# Patient Record
Sex: FEMALE | Race: OTHER
[De-identification: names, ages, dates, MRNs, and addresses within clinical notes are randomized per-mention and may not be internally consistent; named-entity substitution may affect disease eponyms.]

---

## 2017-11-08 ENCOUNTER — RESULT REVIEW (OUTPATIENT)
Age: 22
End: 2017-11-08

## 2018-09-11 PROBLEM — Z00.00 ENCOUNTER FOR PREVENTIVE HEALTH EXAMINATION: Status: ACTIVE | Noted: 2018-09-11

## 2018-11-13 ENCOUNTER — RESULT REVIEW (OUTPATIENT)
Age: 23
End: 2018-11-13

## 2019-11-13 ENCOUNTER — RESULT REVIEW (OUTPATIENT)
Age: 24
End: 2019-11-13

## 2020-08-12 ENCOUNTER — TRANSCRIPTION ENCOUNTER (OUTPATIENT)
Age: 25
End: 2020-08-12

## 2020-08-18 ENCOUNTER — APPOINTMENT (OUTPATIENT)
Dept: PULMONOLOGY | Facility: CLINIC | Age: 25
End: 2020-08-18
Payer: COMMERCIAL

## 2020-08-18 VITALS
WEIGHT: 175 LBS | BODY MASS INDEX: 26.52 KG/M2 | OXYGEN SATURATION: 98 % | DIASTOLIC BLOOD PRESSURE: 76 MMHG | SYSTOLIC BLOOD PRESSURE: 128 MMHG | HEART RATE: 92 BPM | HEIGHT: 68 IN

## 2020-08-18 DIAGNOSIS — R06.00 DYSPNEA, UNSPECIFIED: ICD-10-CM

## 2020-08-18 DIAGNOSIS — U07.1 COVID-19: ICD-10-CM

## 2020-08-18 PROCEDURE — 99204 OFFICE O/P NEW MOD 45 MIN: CPT

## 2020-08-18 NOTE — DISCUSSION/SUMMARY
[FreeTextEntry1] : covid 19 infection in April\par mostly loss of taste and smell, still residual\par episodes of SOB, associated with cough intermittent over months, worse recently\par No fevers, No hx asthma or lung disease, never smoker\par Recent urgent care , Covid negative, has positive Ab\par Lung exam is normal, normal sp02\par Discussed options with pt, she does not want ER\par Given prolonged symptoms and overall improved-as she is back at work\par  will obtain stat Covid bundle, D dimer, CXR, duplex, CTA if D dimer positive\par Further input post above

## 2020-08-18 NOTE — REVIEW OF SYSTEMS
[Chest Discomfort] : chest discomfort [Negative] : Psychiatric [TextBox_14] : loss of smell [TextBox_30] : see HPI

## 2020-08-18 NOTE — CONSULT LETTER
[Dear  ___] : Dear  [unfilled], [Consult Letter:] : I had the pleasure of evaluating your patient, [unfilled]. [Please see my note below.] : Please see my note below. [Sincerely,] : Sincerely, [FreeTextEntry3] : Lan Sanchez DO [facility]\par Pulmonary Critical Care\par Director Pulmonary Division\par Medical Director Respiratory Therapy\par [facility]\par \par

## 2020-08-18 NOTE — PHYSICAL EXAM
[No Acute Distress] : no acute distress [Normal Appearance] : normal appearance [Normal Rate/Rhythm] : normal rate/rhythm [Normal S1, S2] : normal s1, s2 [No Murmurs] : no murmurs [No Resp Distress] : no resp distress [Clear to Auscultation Bilaterally] : clear to auscultation bilaterally [Normal to Percussion] : normal to percussion [Normal Gait] : normal gait [No Abnormalities] : no abnormalities [No Clubbing] : no clubbing [No Cyanosis] : no cyanosis [Normal Color/ Pigmentation] : normal color/ pigmentation [No Edema] : no edema [Oriented x3] : oriented x3 [No Focal Deficits] : no focal deficits [Normal Affect] : normal affect

## 2020-08-18 NOTE — HISTORY OF PRESENT ILLNESS
[TextBox_4] : covid positive april\par loss of smell and taste and SOB\par no fevers, \par worsening SOB intermittent over 2 days\par no fevers, \par having fatigue\par works in  [employer]\par not sexually active\par never smoker\par recent urgent care visit, Covid negative

## 2020-08-19 LAB
ALBUMIN SERPL ELPH-MCNC: 4.7 G/DL
ALP BLD-CCNC: 43 U/L
ALT SERPL-CCNC: 16 U/L
ANION GAP SERPL CALC-SCNC: 13 MMOL/L
AST SERPL-CCNC: 17 U/L
BASOPHILS # BLD AUTO: 0.03 K/UL
BASOPHILS NFR BLD AUTO: 0.6 %
BILIRUB SERPL-MCNC: <0.2 MG/DL
BUN SERPL-MCNC: 10 MG/DL
CALCIUM SERPL-MCNC: 9.9 MG/DL
CHLORIDE SERPL-SCNC: 106 MMOL/L
CO2 SERPL-SCNC: 22 MMOL/L
CREAT SERPL-MCNC: 0.74 MG/DL
CRP SERPL-MCNC: 0.64 MG/DL
DEPRECATED D DIMER PPP IA-ACNC: <150 NG/ML DDU
EOSINOPHIL # BLD AUTO: 0.05 K/UL
EOSINOPHIL NFR BLD AUTO: 1 %
FERRITIN SERPL-MCNC: 26 NG/ML
GLUCOSE SERPL-MCNC: 107 MG/DL
HCT VFR BLD CALC: 45.8 %
HGB BLD-MCNC: 14.4 G/DL
IMM GRANULOCYTES NFR BLD AUTO: 0.2 %
LYMPHOCYTES # BLD AUTO: 1.39 K/UL
LYMPHOCYTES NFR BLD AUTO: 27.8 %
MAN DIFF?: NORMAL
MCHC RBC-ENTMCNC: 29.1 PG
MCHC RBC-ENTMCNC: 31.4 GM/DL
MCV RBC AUTO: 92.7 FL
MONOCYTES # BLD AUTO: 0.39 K/UL
MONOCYTES NFR BLD AUTO: 7.8 %
NEUTROPHILS # BLD AUTO: 3.13 K/UL
NEUTROPHILS NFR BLD AUTO: 62.6 %
PLATELET # BLD AUTO: 346 K/UL
POTASSIUM SERPL-SCNC: 4.4 MMOL/L
PROCALCITONIN SERPL-MCNC: 0.05 NG/ML
PROT SERPL-MCNC: 7 G/DL
RBC # BLD: 4.94 M/UL
RBC # FLD: 12.7 %
SODIUM SERPL-SCNC: 141 MMOL/L
WBC # FLD AUTO: 5 K/UL

## 2020-10-11 ENCOUNTER — TRANSCRIPTION ENCOUNTER (OUTPATIENT)
Age: 25
End: 2020-10-11

## 2020-12-02 ENCOUNTER — RESULT REVIEW (OUTPATIENT)
Age: 25
End: 2020-12-02

## 2021-12-27 ENCOUNTER — RESULT REVIEW (OUTPATIENT)
Age: 26
End: 2021-12-27

## 2022-12-21 ENCOUNTER — OFFICE (OUTPATIENT)
Dept: URBAN - METROPOLITAN AREA CLINIC 114 | Facility: CLINIC | Age: 27
Setting detail: OPHTHALMOLOGY
End: 2022-12-21
Payer: COMMERCIAL

## 2022-12-21 DIAGNOSIS — H01.005: ICD-10-CM

## 2022-12-21 DIAGNOSIS — H01.002: ICD-10-CM

## 2022-12-21 DIAGNOSIS — H52.13: ICD-10-CM

## 2022-12-21 PROCEDURE — 92004 COMPRE OPH EXAM NEW PT 1/>: CPT | Performed by: SPECIALIST

## 2022-12-21 PROCEDURE — 92015 DETERMINE REFRACTIVE STATE: CPT | Performed by: SPECIALIST

## 2022-12-21 ASSESSMENT — CONFRONTATIONAL VISUAL FIELD TEST (CVF)
OD_FINDINGS: FULL
OS_FINDINGS: FULL

## 2022-12-21 ASSESSMENT — REFRACTION_MANIFEST
OD_SPHERE: -1.75
OS_SPHERE: -1.75

## 2022-12-21 ASSESSMENT — LID EXAM ASSESSMENTS
OD_BLEPHARITIS: RLL T
OS_BLEPHARITIS: LLL T

## 2022-12-21 ASSESSMENT — REFRACTION_CURRENTRX
OD_SPHERE: -1.50
OD_OVR_VA: 20/
OS_OVR_VA: 20/
OS_SPHERE: -1.50

## 2022-12-21 ASSESSMENT — VISUAL ACUITY
OD_BCVA: 20/20
OS_BCVA: 20/20

## 2022-12-21 ASSESSMENT — REFRACTION_AUTOREFRACTION
OD_SPHERE: -1.75
OS_SPHERE: -1.75

## 2022-12-21 ASSESSMENT — SUPERFICIAL PUNCTATE KERATITIS (SPK)
OD_SPK: T
OS_SPK: T

## 2022-12-21 ASSESSMENT — TONOMETRY
OS_IOP_MMHG: 16
OD_IOP_MMHG: 16

## 2023-03-23 ENCOUNTER — APPOINTMENT (OUTPATIENT)
Dept: DERMATOLOGY | Facility: CLINIC | Age: 28
End: 2023-03-23
Payer: COMMERCIAL

## 2023-03-23 PROCEDURE — 99203 OFFICE O/P NEW LOW 30 MIN: CPT | Mod: 25

## 2023-03-23 PROCEDURE — 11900 INJECT SKIN LESIONS </W 7: CPT

## 2023-07-20 ENCOUNTER — APPOINTMENT (OUTPATIENT)
Dept: DERMATOLOGY | Facility: CLINIC | Age: 28
End: 2023-07-20
Payer: COMMERCIAL

## 2023-07-20 PROCEDURE — 99214 OFFICE O/P EST MOD 30 MIN: CPT

## 2023-12-21 ENCOUNTER — NON-APPOINTMENT (OUTPATIENT)
Age: 28
End: 2023-12-21

## 2023-12-22 ENCOUNTER — RESULT REVIEW (OUTPATIENT)
Age: 28
End: 2023-12-22

## 2024-07-01 LAB — TSH SERPL-ACNC: 2.75

## 2024-07-02 ENCOUNTER — APPOINTMENT (OUTPATIENT)
Dept: ENDOCRINOLOGY | Facility: CLINIC | Age: 29
End: 2024-07-02
Payer: COMMERCIAL

## 2024-07-02 ENCOUNTER — NON-APPOINTMENT (OUTPATIENT)
Age: 29
End: 2024-07-02

## 2024-07-02 VITALS
HEIGHT: 68 IN | SYSTOLIC BLOOD PRESSURE: 118 MMHG | OXYGEN SATURATION: 99 % | WEIGHT: 187 LBS | DIASTOLIC BLOOD PRESSURE: 78 MMHG | BODY MASS INDEX: 28.34 KG/M2 | HEART RATE: 91 BPM

## 2024-07-02 DIAGNOSIS — Z78.9 OTHER SPECIFIED HEALTH STATUS: ICD-10-CM

## 2024-07-02 DIAGNOSIS — Z83.3 FAMILY HISTORY OF DIABETES MELLITUS: ICD-10-CM

## 2024-07-02 DIAGNOSIS — O99.280 ENDOCRINE, NUTRITIONAL AND METABOLIC DISEASES COMPLICATING PREGNANCY, UNSPECIFIED TRIMESTER: ICD-10-CM

## 2024-07-02 DIAGNOSIS — Z83.49 FAMILY HISTORY OF OTHER ENDOCRINE, NUTRITIONAL AND METABOLIC DISEASES: ICD-10-CM

## 2024-07-02 DIAGNOSIS — Z80.1 FAMILY HISTORY OF MALIGNANT NEOPLASM OF TRACHEA, BRONCHUS AND LUNG: ICD-10-CM

## 2024-07-02 DIAGNOSIS — E03.9 ENDOCRINE, NUTRITIONAL AND METABOLIC DISEASES COMPLICATING PREGNANCY, UNSPECIFIED TRIMESTER: ICD-10-CM

## 2024-07-02 PROCEDURE — 99203 OFFICE O/P NEW LOW 30 MIN: CPT

## 2024-07-02 RX ORDER — LEVOTHYROXINE SODIUM 125 UG/1
125 TABLET ORAL
Refills: 0 | Status: ACTIVE | COMMUNITY

## 2024-07-03 LAB
ALBUMIN SERPL ELPH-MCNC: 4.1 G/DL
ALP BLD-CCNC: 51 U/L
ALT SERPL-CCNC: 12 U/L
ANION GAP SERPL CALC-SCNC: 12 MMOL/L
AST SERPL-CCNC: 14 U/L
BILIRUB SERPL-MCNC: 0.2 MG/DL
BUN SERPL-MCNC: 9 MG/DL
CALCIUM SERPL-MCNC: 9.5 MG/DL
CHLORIDE SERPL-SCNC: 103 MMOL/L
CO2 SERPL-SCNC: 20 MMOL/L
CREAT SERPL-MCNC: 0.51 MG/DL
EGFR: 130 ML/MIN/1.73M2
GLUCOSE SERPL-MCNC: 82 MG/DL
POTASSIUM SERPL-SCNC: 4 MMOL/L
PROT SERPL-MCNC: 6.7 G/DL
SODIUM SERPL-SCNC: 136 MMOL/L
T3FREE SERPL-MCNC: 2.64 PG/ML
T4 FREE SERPL-MCNC: 1.3 NG/DL
TSH SERPL-ACNC: 1.89 UIU/ML

## 2024-07-05 LAB
THYROGLOB AB SERPL-ACNC: <20 IU/ML
THYROPEROXIDASE AB SERPL IA-ACNC: 2506 IU/ML

## 2024-08-30 ENCOUNTER — LABORATORY RESULT (OUTPATIENT)
Age: 29
End: 2024-08-30

## 2024-09-05 ENCOUNTER — APPOINTMENT (OUTPATIENT)
Dept: ENDOCRINOLOGY | Facility: CLINIC | Age: 29
End: 2024-09-05
Payer: COMMERCIAL

## 2024-09-05 VITALS
HEART RATE: 88 BPM | OXYGEN SATURATION: 99 % | DIASTOLIC BLOOD PRESSURE: 52 MMHG | HEIGHT: 68 IN | WEIGHT: 190 LBS | BODY MASS INDEX: 28.79 KG/M2 | SYSTOLIC BLOOD PRESSURE: 100 MMHG

## 2024-09-05 DIAGNOSIS — O99.280 ENDOCRINE, NUTRITIONAL AND METABOLIC DISEASES COMPLICATING PREGNANCY, UNSPECIFIED TRIMESTER: ICD-10-CM

## 2024-09-05 DIAGNOSIS — E03.9 ENDOCRINE, NUTRITIONAL AND METABOLIC DISEASES COMPLICATING PREGNANCY, UNSPECIFIED TRIMESTER: ICD-10-CM

## 2024-09-05 PROCEDURE — 99213 OFFICE O/P EST LOW 20 MIN: CPT

## 2024-09-05 NOTE — REVIEW OF SYSTEMS
[Fatigue] : no fatigue [Visual Field Defect] : no visual field defect [Dysphagia] : no dysphagia [Chest Pain] : no chest pain [Palpitations] : no palpitations [Shortness Of Breath] : no shortness of breath [Nausea] : no nausea [Constipation] : constipation [Vomiting] : no vomiting [Diarrhea] : no diarrhea [Polyuria] : no polyuria [Polydipsia] : no polydipsia

## 2024-09-05 NOTE — ASSESSMENT
[Levothyroxine] : The patient was instructed to take Levothyroxine on an empty stomach, separate from vitamins, and wait at least 30 minutes before eating [FreeTextEntry1] : Hypothyroidism in pregnancy   -Check TFTs, TPO -Goal TSH <2.5 during pregnancy -Check TFTs every 4 weeks during pregnancy rx given for Sept, Oct, Nov -Patient verbalized understanding and all questions answered -For now Continue Synthroid 125 mcg QD   RTO in 8 weeks  MD

## 2024-09-05 NOTE — HISTORY OF PRESENT ILLNESS
[FreeTextEntry1] : Patient was sent in by OB for thyroid evaluation. Prior to preganancy was on LT4 since 2021. She was on LT4 100 mcg. Went through IVF and once she got pregnant Her TSH went up to 7--> was increased to 112 mcg then 125mcg then down to 112 mcg.   Now on Synthroid 125 mcg since 5/22/2024  OB: Dr. Georgia [place]  LMP: 2/1/2023 BUZZ: 12/6/2024 Weeks: 27 weeks  G1 P 0  IVF pregnancy, Embryo transfer was on 3/2/2024  Quality: Hypothyroidism  Onset: was feeling out all the time Duration: 2021 Modifying factors: LT4  Family Hx: Mom hypothyroidism, maternal family  Denies thyroid cancer  Current Medications: Synthroid 125 mcg QD ([facility]) PNV, takes 4 hours after   Thyroid U/S: never    Weight:  Pre-Preg 180 pounds

## 2024-09-10 ENCOUNTER — OUTPATIENT (OUTPATIENT)
Dept: OUTPATIENT SERVICES | Facility: HOSPITAL | Age: 29
LOS: 1 days | End: 2024-09-10
Payer: COMMERCIAL

## 2024-09-10 VITALS — HEART RATE: 89 BPM | DIASTOLIC BLOOD PRESSURE: 69 MMHG | SYSTOLIC BLOOD PRESSURE: 118 MMHG

## 2024-09-10 VITALS
TEMPERATURE: 99 F | DIASTOLIC BLOOD PRESSURE: 81 MMHG | SYSTOLIC BLOOD PRESSURE: 133 MMHG | RESPIRATION RATE: 18 BRPM | HEART RATE: 87 BPM

## 2024-09-10 DIAGNOSIS — O26.899 OTHER SPECIFIED PREGNANCY RELATED CONDITIONS, UNSPECIFIED TRIMESTER: ICD-10-CM

## 2024-09-10 LAB
ALBUMIN SERPL ELPH-MCNC: 3.5 G/DL — SIGNIFICANT CHANGE UP (ref 3.3–5.2)
ALP SERPL-CCNC: 73 U/L — SIGNIFICANT CHANGE UP (ref 40–120)
ALT FLD-CCNC: 9 U/L — SIGNIFICANT CHANGE UP
ANION GAP SERPL CALC-SCNC: 13 MMOL/L — SIGNIFICANT CHANGE UP (ref 5–17)
APPEARANCE UR: CLEAR — SIGNIFICANT CHANGE UP
APTT BLD: 28.2 SEC — SIGNIFICANT CHANGE UP (ref 24.5–35.6)
AST SERPL-CCNC: 15 U/L — SIGNIFICANT CHANGE UP
BACTERIA # UR AUTO: NEGATIVE /HPF — SIGNIFICANT CHANGE UP
BASOPHILS # BLD AUTO: 0.02 K/UL — SIGNIFICANT CHANGE UP (ref 0–0.2)
BASOPHILS NFR BLD AUTO: 0.2 % — SIGNIFICANT CHANGE UP (ref 0–2)
BILIRUB SERPL-MCNC: <0.2 MG/DL — LOW (ref 0.4–2)
BILIRUB UR-MCNC: NEGATIVE — SIGNIFICANT CHANGE UP
BUN SERPL-MCNC: 6.4 MG/DL — LOW (ref 8–20)
CALCIUM SERPL-MCNC: 8.7 MG/DL — SIGNIFICANT CHANGE UP (ref 8.4–10.5)
CAST: 1 /LPF — SIGNIFICANT CHANGE UP (ref 0–4)
CHLORIDE SERPL-SCNC: 102 MMOL/L — SIGNIFICANT CHANGE UP (ref 96–108)
CO2 SERPL-SCNC: 21 MMOL/L — LOW (ref 22–29)
COLOR SPEC: YELLOW — SIGNIFICANT CHANGE UP
CREAT ?TM UR-MCNC: 92 MG/DL — SIGNIFICANT CHANGE UP
CREAT SERPL-MCNC: 0.5 MG/DL — SIGNIFICANT CHANGE UP (ref 0.5–1.3)
DIFF PNL FLD: NEGATIVE — SIGNIFICANT CHANGE UP
EGFR: 131 ML/MIN/1.73M2 — SIGNIFICANT CHANGE UP
EOSINOPHIL # BLD AUTO: 0.08 K/UL — SIGNIFICANT CHANGE UP (ref 0–0.5)
EOSINOPHIL NFR BLD AUTO: 0.8 % — SIGNIFICANT CHANGE UP (ref 0–6)
FIBRINOGEN PPP-MCNC: 414 MG/DL — SIGNIFICANT CHANGE UP (ref 200–450)
GLUCOSE SERPL-MCNC: 102 MG/DL — HIGH (ref 70–99)
GLUCOSE UR QL: NEGATIVE MG/DL — SIGNIFICANT CHANGE UP
HCT VFR BLD CALC: 35 % — SIGNIFICANT CHANGE UP (ref 34.5–45)
HGB BLD-MCNC: 12 G/DL — SIGNIFICANT CHANGE UP (ref 11.5–15.5)
IMM GRANULOCYTES NFR BLD AUTO: 0.6 % — SIGNIFICANT CHANGE UP (ref 0–0.9)
INR BLD: 0.96 RATIO — SIGNIFICANT CHANGE UP (ref 0.85–1.18)
KETONES UR-MCNC: NEGATIVE MG/DL — SIGNIFICANT CHANGE UP
LDH SERPL L TO P-CCNC: 214 U/L — HIGH (ref 98–192)
LEUKOCYTE ESTERASE UR-ACNC: ABNORMAL
LYMPHOCYTES # BLD AUTO: 1.82 K/UL — SIGNIFICANT CHANGE UP (ref 1–3.3)
LYMPHOCYTES # BLD AUTO: 17.8 % — SIGNIFICANT CHANGE UP (ref 13–44)
MCHC RBC-ENTMCNC: 31.1 PG — SIGNIFICANT CHANGE UP (ref 27–34)
MCHC RBC-ENTMCNC: 34.3 GM/DL — SIGNIFICANT CHANGE UP (ref 32–36)
MCV RBC AUTO: 90.7 FL — SIGNIFICANT CHANGE UP (ref 80–100)
MONOCYTES # BLD AUTO: 0.63 K/UL — SIGNIFICANT CHANGE UP (ref 0–0.9)
MONOCYTES NFR BLD AUTO: 6.2 % — SIGNIFICANT CHANGE UP (ref 2–14)
NEUTROPHILS # BLD AUTO: 7.6 K/UL — HIGH (ref 1.8–7.4)
NEUTROPHILS NFR BLD AUTO: 74.4 % — SIGNIFICANT CHANGE UP (ref 43–77)
NITRITE UR-MCNC: NEGATIVE — SIGNIFICANT CHANGE UP
PH UR: 6.5 — SIGNIFICANT CHANGE UP (ref 5–8)
PLATELET # BLD AUTO: 267 K/UL — SIGNIFICANT CHANGE UP (ref 150–400)
POTASSIUM SERPL-MCNC: 3.7 MMOL/L — SIGNIFICANT CHANGE UP (ref 3.5–5.3)
POTASSIUM SERPL-SCNC: 3.7 MMOL/L — SIGNIFICANT CHANGE UP (ref 3.5–5.3)
PROT ?TM UR-MCNC: 6 MG/DL — SIGNIFICANT CHANGE UP (ref 0–12)
PROT SERPL-MCNC: 5.9 G/DL — LOW (ref 6.6–8.7)
PROT UR-MCNC: NEGATIVE MG/DL — SIGNIFICANT CHANGE UP
PROT/CREAT UR-RTO: 0.1 RATIO — SIGNIFICANT CHANGE UP
PROTHROM AB SERPL-ACNC: 10.7 SEC — SIGNIFICANT CHANGE UP (ref 9.5–13)
RBC # BLD: 3.86 M/UL — SIGNIFICANT CHANGE UP (ref 3.8–5.2)
RBC # FLD: 13.3 % — SIGNIFICANT CHANGE UP (ref 10.3–14.5)
RBC CASTS # UR COMP ASSIST: 4 /HPF — SIGNIFICANT CHANGE UP (ref 0–4)
SODIUM SERPL-SCNC: 136 MMOL/L — SIGNIFICANT CHANGE UP (ref 135–145)
SP GR SPEC: 1.02 — SIGNIFICANT CHANGE UP (ref 1–1.03)
SQUAMOUS # UR AUTO: 2 /HPF — SIGNIFICANT CHANGE UP (ref 0–5)
URATE SERPL-MCNC: 3 MG/DL — SIGNIFICANT CHANGE UP (ref 2.4–5.7)
UROBILINOGEN FLD QL: 0.2 MG/DL — SIGNIFICANT CHANGE UP (ref 0.2–1)
WBC # BLD: 10.21 K/UL — SIGNIFICANT CHANGE UP (ref 3.8–10.5)
WBC # FLD AUTO: 10.21 K/UL — SIGNIFICANT CHANGE UP (ref 3.8–10.5)
WBC UR QL: 3 /HPF — SIGNIFICANT CHANGE UP (ref 0–5)

## 2024-09-10 PROCEDURE — 84550 ASSAY OF BLOOD/URIC ACID: CPT

## 2024-09-10 PROCEDURE — 85730 THROMBOPLASTIN TIME PARTIAL: CPT

## 2024-09-10 PROCEDURE — 85025 COMPLETE CBC W/AUTO DIFF WBC: CPT

## 2024-09-10 PROCEDURE — 59025 FETAL NON-STRESS TEST: CPT

## 2024-09-10 PROCEDURE — 80053 COMPREHEN METABOLIC PANEL: CPT

## 2024-09-10 PROCEDURE — 83615 LACTATE (LD) (LDH) ENZYME: CPT

## 2024-09-10 PROCEDURE — 84156 ASSAY OF PROTEIN URINE: CPT

## 2024-09-10 PROCEDURE — 81001 URINALYSIS AUTO W/SCOPE: CPT

## 2024-09-10 PROCEDURE — G0463: CPT

## 2024-09-10 PROCEDURE — 36415 COLL VENOUS BLD VENIPUNCTURE: CPT

## 2024-09-10 PROCEDURE — 85610 PROTHROMBIN TIME: CPT

## 2024-09-10 PROCEDURE — 85384 FIBRINOGEN ACTIVITY: CPT

## 2024-09-10 PROCEDURE — 82570 ASSAY OF URINE CREATININE: CPT

## 2024-09-10 NOTE — OB PROVIDER TRIAGE NOTE - NSHPPHYSICALEXAM_GEN_ALL_CORE
T(C): 37 (09-10-24 @ 20:58), Max: 37 (09-10-24 @ 20:58)  HR: 87 (09-10-24 @ 21:06) (87 - 87)  BP: 133/81 (09-10-24 @ 21:06) (133/81 - 133/81)  RR: 18 (09-10-24 @ 20:58) (18 - 18)  SpO2: --    Gen: NAD, well-appearing  Abd: soft, gravid  Ext: non-edematous, non-tender   SVE:   SSE: cervix visualized, closed and without any signs of bleeding or drainage, no pooling   FHT:   [place]: T(C): 37 (09-10-24 @ 20:58), Max: 37 (09-10-24 @ 20:58)  HR: 87 (09-10-24 @ 21:06) (87 - 87)  BP: 133/81 (09-10-24 @ 21:06) (133/81 - 133/81)  RR: 18 (09-10-24 @ 20:58) (18 - 18)  SpO2: 99    Gen: NAD, well-appearing, not in any discomfort   Lungs: Saturating well on room air   Abd: soft, gravid  Ext: non-edematous, non-tender   FHT: 140bpm, moderate variability, + accels, - decels  [place]: irregular

## 2024-09-10 NOTE — OB PROVIDER TRIAGE NOTE - NSOBPROVIDERNOTE_OBGYN_ALL_OB_FT
A/P: DANIELLE CARDENAS is a 28y  at [id number] [place] who presents to L&D for headache lasting 3 hours, for r/o PEC.     Maternal VSS  FHT   [place]:  Dispo: Continue to observe.     Discussed with       Signed: Tata Hunt MS4 A/P: DANIELLE CARDENAS is a 28y  at [id number] [place] who presents to L&D for headache lasting 3 hours, for r/o PEC.     Maternal VSS, BP's all within normal range   FHT - reactive   [place]: irregular  PIH labs - within normal limits    Patient currently completely asymptomatic. No further complaints present.     Dispo: Home. PEC/return precautions given. Patient verbalized understanding.     Discussed with Dr. Chapman

## 2024-09-10 NOTE — OB PROVIDER TRIAGE NOTE - NSHPLABSRESULTS_GEN_ALL_CORE
12.0   10.21 )-----------( 267      ( 10 Sep 2024 21:21 )             35.0   09-10    136  |  102  |  6.4<L>  ----------------------------<  102<H>  3.7   |  21.0<L>  |  0.50    Ca    8.7      10 Sep 2024 21:21    TPro  5.9<L>  /  Alb  3.5  /  TBili  <0.2<L>  /  DBili  x   /  AST  15  /  ALT  9   /  AlkPhos  73  09-10    Activated Partial Thromboplastin Time: 28.2  Prothrombin Time, Plasma: 10.7 sec (09.10.24 @ 21:21)  Fibrinogen [place]auss: 414 mg/dL (09.10.24 @ 21:21)

## 2024-09-10 NOTE — OB PROVIDER TRIAGE NOTE - HISTORY OF PRESENT ILLNESS
DANIELLE CARDENAS is a 28y  at [id number] [place] who presents to L&D for headache lasting about 3 hours. She states that the headache started at 5:50pm this evening and was accompanied by a halo and blurry vision. The blurry vision lasted for 45 mins, and the headache was tolerable. The headache went away without her using any medication. She notes that a few days ago she experienced dizziness while at the mall and had to go home. the dizziness went away with rest. Her BPs in the office have been normal with the highest being in the 130s.     She denies LOF, VB, contractions. She endorses good fetal movement.   Pt denies RUQ pain, epigastric pain and new-onset edema.   She denies fevers, chills, nausea, vomiting, shortness of breath, chest pain, and palpitations.    Pregnancy complications: IVF pregnancy, hematoma in the 2nd trimester    POB:     PGYN:      Denies fibroids, ovarian cysts      Denies hx STDs     Denies abnormal pap smears     PMH: Hashimoto's  PSH: tonsillectomy  SH:      Denies tobacco use     Denies EtOH use      Denies illicit drug use      Feels safe at home     Meds: Synthroid (125 mcg x1), PNV  All: NKDA       DANIELLE CARDENAS is a 28y  at 27w4d [place] who presents to L&D for headache lasting about 3 hours. She states that the headache started at 5:50pm this evening and was accompanied by a halo and blurry vision. The blurry vision lasted for 45 mins, and the headache was tolerable. The headache went away without her using any medication. She notes that a few days ago she experienced dizziness while at the mall and had to go home. The dizziness went away with rest. Her BPs in the office have been normal with the highest being in the 130s.     She denies LOF, VB, contractions. She endorses good fetal movement.   Pt denies RUQ pain, epigastric pain and new-onset edema.   She denies fevers, chills, nausea, vomiting, shortness of breath, chest pain, and palpitations.    Pregnancy complications: IVF pregnancy, hematoma in the 2nd trimester    POB:     PGYN:      Denies fibroids, ovarian cysts      Denies hx STDs     Denies abnormal pap smears     PMH: Hashimoto's  PSH: tonsillectomy  SH:      Denies tobacco use     Denies EtOH use      Denies illicit drug use      Feels safe at home     Meds: Synthroid (125 mcg x1), PNV  All: NKDA

## 2024-09-12 DIAGNOSIS — O09.812 SUPERVISION OF PREGNANCY RESULTING FROM ASSISTED REPRODUCTIVE TECHNOLOGY, SECOND TRIMESTER: ICD-10-CM

## 2024-09-12 DIAGNOSIS — H53.8 OTHER VISUAL DISTURBANCES: ICD-10-CM

## 2024-09-12 DIAGNOSIS — O99.891 OTHER SPECIFIED DISEASES AND CONDITIONS COMPLICATING PREGNANCY: ICD-10-CM

## 2024-09-12 DIAGNOSIS — R03.0 ELEVATED BLOOD-PRESSURE READING, WITHOUT DIAGNOSIS OF HYPERTENSION: ICD-10-CM

## 2024-09-12 DIAGNOSIS — Z3A.27 27 WEEKS GESTATION OF PREGNANCY: ICD-10-CM

## 2024-09-12 DIAGNOSIS — O46.92 ANTEPARTUM HEMORRHAGE, UNSPECIFIED, SECOND TRIMESTER: ICD-10-CM

## 2024-09-12 DIAGNOSIS — R42 DIZZINESS AND GIDDINESS: ICD-10-CM

## 2024-09-12 DIAGNOSIS — O26.892 OTHER SPECIFIED PREGNANCY RELATED CONDITIONS, SECOND TRIMESTER: ICD-10-CM

## 2024-09-12 DIAGNOSIS — R51.9 HEADACHE, UNSPECIFIED: ICD-10-CM

## 2024-09-12 DIAGNOSIS — O99.282 ENDOCRINE, NUTRITIONAL AND METABOLIC DISEASES COMPLICATING PREGNANCY, SECOND TRIMESTER: ICD-10-CM

## 2024-09-12 DIAGNOSIS — E06.3 AUTOIMMUNE THYROIDITIS: ICD-10-CM

## 2024-09-18 ENCOUNTER — NON-APPOINTMENT (OUTPATIENT)
Age: 29
End: 2024-09-18

## 2024-09-18 PROBLEM — E06.3 AUTOIMMUNE THYROIDITIS: Chronic | Status: ACTIVE | Noted: 2024-09-10

## 2024-09-19 ENCOUNTER — OFFICE (OUTPATIENT)
Dept: URBAN - METROPOLITAN AREA CLINIC 114 | Facility: CLINIC | Age: 29
Setting detail: OPHTHALMOLOGY
End: 2024-09-19
Payer: COMMERCIAL

## 2024-09-19 ENCOUNTER — ASOB RESULT (OUTPATIENT)
Age: 29
End: 2024-09-19

## 2024-09-19 ENCOUNTER — APPOINTMENT (OUTPATIENT)
Dept: MATERNAL FETAL MEDICINE | Facility: CLINIC | Age: 29
End: 2024-09-19
Payer: COMMERCIAL

## 2024-09-19 DIAGNOSIS — O24.419 GESTATIONAL DIABETES MELLITUS IN PREGNANCY, UNSPECIFIED CONTROL: ICD-10-CM

## 2024-09-19 DIAGNOSIS — H01.005: ICD-10-CM

## 2024-09-19 DIAGNOSIS — H01.002: ICD-10-CM

## 2024-09-19 DIAGNOSIS — H04.123: ICD-10-CM

## 2024-09-19 PROCEDURE — 98960 EDU&TRN PT SELF-MGMT NQHP 1: CPT | Mod: 95

## 2024-09-19 PROCEDURE — G0108 DIAB MANAGE TRN  PER INDIV: CPT | Mod: 95

## 2024-09-19 PROCEDURE — 99213 OFFICE O/P EST LOW 20 MIN: CPT | Performed by: SPECIALIST

## 2024-09-19 RX ORDER — BLOOD-GLUCOSE METER
W/DEVICE KIT MISCELLANEOUS
Qty: 1 | Refills: 0 | Status: ACTIVE | COMMUNITY
Start: 2024-09-19 | End: 1900-01-01

## 2024-09-19 RX ORDER — LANCETS 33 GAUGE
EACH MISCELLANEOUS
Qty: 1 | Refills: 3 | Status: ACTIVE | COMMUNITY
Start: 2024-09-19 | End: 1900-01-01

## 2024-09-19 RX ORDER — BLOOD-GLUCOSE METER
KIT MISCELLANEOUS
Qty: 1 | Refills: 3 | Status: ACTIVE | COMMUNITY
Start: 2024-09-19 | End: 1900-01-01

## 2024-09-19 ASSESSMENT — CONFRONTATIONAL VISUAL FIELD TEST (CVF)
OS_FINDINGS: FULL
OD_FINDINGS: FULL

## 2024-09-19 ASSESSMENT — LID EXAM ASSESSMENTS
OS_BLEPHARITIS: LLL T
OD_BLEPHARITIS: RLL T

## 2024-09-26 ENCOUNTER — ASOB RESULT (OUTPATIENT)
Age: 29
End: 2024-09-26

## 2024-09-26 ENCOUNTER — APPOINTMENT (OUTPATIENT)
Dept: MATERNAL FETAL MEDICINE | Facility: CLINIC | Age: 29
End: 2024-09-26
Payer: COMMERCIAL

## 2024-09-26 PROCEDURE — G0108 DIAB MANAGE TRN  PER INDIV: CPT | Mod: 95

## 2024-09-26 PROCEDURE — 98960 EDU&TRN PT SELF-MGMT NQHP 1: CPT | Mod: 95

## 2024-10-01 ENCOUNTER — APPOINTMENT (OUTPATIENT)
Dept: ANTEPARTUM | Facility: CLINIC | Age: 29
End: 2024-10-01
Payer: COMMERCIAL

## 2024-10-01 ENCOUNTER — APPOINTMENT (OUTPATIENT)
Dept: MATERNAL FETAL MEDICINE | Facility: CLINIC | Age: 29
End: 2024-10-01
Payer: COMMERCIAL

## 2024-10-01 ENCOUNTER — ASOB RESULT (OUTPATIENT)
Age: 29
End: 2024-10-01

## 2024-10-01 VITALS
WEIGHT: 192 LBS | BODY MASS INDEX: 30.13 KG/M2 | SYSTOLIC BLOOD PRESSURE: 122 MMHG | HEIGHT: 67 IN | HEART RATE: 97 BPM | DIASTOLIC BLOOD PRESSURE: 74 MMHG | RESPIRATION RATE: 18 BRPM | OXYGEN SATURATION: 99 %

## 2024-10-01 DIAGNOSIS — Z3A.30 30 WEEKS GESTATION OF PREGNANCY: ICD-10-CM

## 2024-10-01 DIAGNOSIS — R06.09 OTHER FORMS OF DYSPNEA: ICD-10-CM

## 2024-10-01 DIAGNOSIS — O99.280 ENDOCRINE, NUTRITIONAL AND METABOLIC DISEASES COMPLICATING PREGNANCY, UNSPECIFIED TRIMESTER: ICD-10-CM

## 2024-10-01 DIAGNOSIS — O24.410 GESTATIONAL DIABETES MELLITUS IN PREGNANCY, DIET CONTROLLED: ICD-10-CM

## 2024-10-01 DIAGNOSIS — O09.813 SUPERVISION OF PREGNANCY RESULTING FROM ASSISTED REPRODUCTIVE TECHNOLOGY, THIRD TRIMESTER: ICD-10-CM

## 2024-10-01 DIAGNOSIS — E03.9 ENDOCRINE, NUTRITIONAL AND METABOLIC DISEASES COMPLICATING PREGNANCY, UNSPECIFIED TRIMESTER: ICD-10-CM

## 2024-10-01 DIAGNOSIS — U07.1 COVID-19: ICD-10-CM

## 2024-10-01 DIAGNOSIS — O99.213 OBESITY COMPLICATING PREGNANCY, THIRD TRIMESTER: ICD-10-CM

## 2024-10-01 PROCEDURE — 76816 OB US FOLLOW-UP PER FETUS: CPT

## 2024-10-01 PROCEDURE — 76820 UMBILICAL ARTERY ECHO: CPT | Mod: 59

## 2024-10-01 PROCEDURE — 76819 FETAL BIOPHYS PROFIL W/O NST: CPT

## 2024-10-01 PROCEDURE — 99204 OFFICE O/P NEW MOD 45 MIN: CPT

## 2024-10-01 RX ORDER — PRENATAL VIT NO.126/IRON/FOLIC 28MG-0.8MG
28-0.8 TABLET ORAL
Refills: 0 | Status: ACTIVE | COMMUNITY

## 2024-10-01 NOTE — DISCUSSION/SUMMARY
[FreeTextEntry1] : Maria Del Carmen is a 28-year-old  being seen today for gestational diabetes, maternal hypothyroidism, maternal obesity and pregnancy achieved through in vitro fertilization.   A growth scan was performed today and reveals a single viable intrauterine gestation with the estimated fetal weight of 4 pounds 1 ounce consistent with the 79th percentile.  Vertex presentation with a right lateral placenta seen and the amniotic fluid index is normal at 14.65 cm.  Vital signs today reveal blood pressure 122/74, maternal weight is 192 pounds consistent with a BMI 30.07 kg.  Gestational diabetes;  Dietary consultation was performed and report was sent under separate cover.  Evaluation of the patient's diabetic flowsheets demonstrates good control of fasting and postprandial blood sugars.  Medical intervention is not recommended at this time.  Problems and complications related to a diabetic pregnancy including fetal macrosomia, increased incidence of operative vaginal delivery and  section, shoulder dystocia with associated morbidity mortality and increased incidence of  intensive care unit admission were discussed.  A follow-up dietary consultation is scheduled.  A growth scan in 1 month is recommended.  Weekly  testing beginning at 36 weeks until delivery is also recommended.  Delivery on or around the patient's EDC is appropriate.  Follow-up maternal-fetal medicine consultation as clinically indicated.  All of the above was discussed with Maria Del Carmen and her  and all their questions were answered.  They are comfortable with the current management plan.  She was diagnosed with hypothyroidism at 2 months of pregnancy and is on 0.125 mcg of Synthroid daily.  Her most recent TSH was 1.61 which is appropriate.  TSH should be checked every 6 to 8 weeks with adjustment of medication based on those results.  Well-controlled maternal hypothyroidism is not associated with adverse pregnancy outcome.  Maria Del Carmen received the initial COVID-19 vaccination with the Pfizer vaccine.  Risks and benefits of booster vaccination in pregnancy were discussed and after all of her questions were answered she has declined booster vaccination in pregnancy.  She understands that she has certain comorbidities that put her at increased risk for problems and/or complications should she have a COVID infection.  She will contact your office to discuss various treatment options should that occur.  Her mother has diabetes and Hashimoto's thyroiditis.  She has had her tonsils and adenoids removed.  She has no known allergies to medications and denies alcohol, tobacco or drug use.  I spent a total of 37 minutes reviewing the patient's prenatal record, outside medical records, previous consultations and ultrasound reports counseling and coordinating care.  Recommendations;  1.  Continue current ADA diet and home glucose fine. 2.  Follow-up dietary consultation as scheduled. 3.  Growth scan in 1 month is recommended. 4.  Weekly  testing beginning at 36 weeks until delivery is recommended. 5.  Delivery between 39 and 40 weeks is appropriate. 6.  Follow-up maternal-fetal medicine consultation as clinically indicated.

## 2024-10-16 ENCOUNTER — APPOINTMENT (OUTPATIENT)
Dept: MATERNAL FETAL MEDICINE | Facility: CLINIC | Age: 29
End: 2024-10-16
Payer: COMMERCIAL

## 2024-10-16 ENCOUNTER — ASOB RESULT (OUTPATIENT)
Age: 29
End: 2024-10-16

## 2024-10-16 PROCEDURE — 98960 EDU&TRN PT SELF-MGMT NQHP 1: CPT | Mod: 95

## 2024-10-16 PROCEDURE — G0108 DIAB MANAGE TRN  PER INDIV: CPT | Mod: 95
